# Patient Record
Sex: FEMALE | Race: WHITE | NOT HISPANIC OR LATINO | Employment: OTHER | URBAN - METROPOLITAN AREA
[De-identification: names, ages, dates, MRNs, and addresses within clinical notes are randomized per-mention and may not be internally consistent; named-entity substitution may affect disease eponyms.]

---

## 2022-02-25 ENCOUNTER — HOSPITAL ENCOUNTER (EMERGENCY)
Facility: HOSPITAL | Age: 72
Discharge: HOME | End: 2022-02-26
Attending: EMERGENCY MEDICINE
Payer: MEDICARE
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
## 2022-02-25 DIAGNOSIS — S12.401A CLOSED NONDISPLACED FRACTURE OF FIFTH CERVICAL VERTEBRA, UNSPECIFIED FRACTURE MORPHOLOGY, INITIAL ENCOUNTER: Primary | ICD-10-CM

## 2022-02-25 DIAGNOSIS — M54.2 NECK PAIN: ICD-10-CM

## 2022-02-25 PROCEDURE — 99284 PR EMERGENCY DEPT VISIT,LEVEL IV: ICD-10-PCS | Mod: ,,, | Performed by: EMERGENCY MEDICINE

## 2022-02-25 PROCEDURE — 99284 EMERGENCY DEPT VISIT MOD MDM: CPT | Mod: ,,, | Performed by: EMERGENCY MEDICINE

## 2022-02-25 PROCEDURE — 25000003 PHARM REV CODE 250: Performed by: EMERGENCY MEDICINE

## 2022-02-25 PROCEDURE — 99284 EMERGENCY DEPT VISIT MOD MDM: CPT | Mod: 25

## 2022-02-25 PROCEDURE — 25000003 PHARM REV CODE 250: Performed by: STUDENT IN AN ORGANIZED HEALTH CARE EDUCATION/TRAINING PROGRAM

## 2022-02-25 RX ORDER — OXYCODONE HYDROCHLORIDE 5 MG/1
5 TABLET ORAL
Status: COMPLETED | OUTPATIENT
Start: 2022-02-25 | End: 2022-02-25

## 2022-02-25 RX ADMIN — OXYCODONE 5 MG: 5 TABLET ORAL at 06:02

## 2022-02-25 RX ADMIN — OXYCODONE 5 MG: 5 TABLET ORAL at 11:02

## 2022-02-26 VITALS
HEART RATE: 56 BPM | WEIGHT: 190 LBS | BODY MASS INDEX: 30.53 KG/M2 | HEIGHT: 66 IN | OXYGEN SATURATION: 99 % | DIASTOLIC BLOOD PRESSURE: 68 MMHG | TEMPERATURE: 98 F | SYSTOLIC BLOOD PRESSURE: 133 MMHG | RESPIRATION RATE: 16 BRPM

## 2022-02-26 RX ORDER — OXYCODONE HYDROCHLORIDE 5 MG/1
5 TABLET ORAL EVERY 4 HOURS PRN
Qty: 18 TABLET | Refills: 0 | Status: SHIPPED | OUTPATIENT
Start: 2022-02-26 | End: 2022-03-01

## 2022-02-26 RX ORDER — IBUPROFEN 600 MG/1
600 TABLET ORAL EVERY 6 HOURS PRN
Qty: 20 TABLET | Refills: 0 | Status: SHIPPED | OUTPATIENT
Start: 2022-02-26 | End: 2022-03-03

## 2022-02-26 NOTE — PROGRESS NOTES
"ED Resident HAND-OFF NOTE:  10:15 PM 2/25/2022  Eneida Cadena is a 71 y.o. female who presented to the ED on 2/25/2022 and has been managed by Dr. Mota and Dr. Franks, who reports patient C/O fall. I assumed care of patient from off-going ED physician team at 10:15 PM pending MRI cervical spine.    On my evaluation, Eneida Cadena appears well hemodynamically stable and in NAD. Thus far, Eneida Cdaena has received:  Medications   oxyCODONE immediate release tablet 5 mg (5 mg Oral Given 2/25/22 1854)       On my exam, I appreciate:  BP (!) 164/75   Pulse 68   Temp 98.4 °F (36.9 °C) (Oral)   Resp 18   Ht 5' 6" (1.676 m)   Wt 86.2 kg (190 lb)   SpO2 99%   BMI 30.67 kg/m²     ED Course as of 02/26/22 0332 Fri Feb 25, 2022 2127 CT head, maxillofacial, and cervical spine are remarkable for an acute fracture of the lamina and base of the C5 spinous process without significant displacement.  Otherwise, no acute fractures, intracranial hemorrhage, or large vessel infarct.  I had a phone conversation with neurosurgery who recommends obtaining an MRI.  Patient is aware the plan and in agreement. [BD]   2127 Patient's pain has been well controlled with p.o. oxycodone [BD]   2240 MRI still pending.  Patient care has been handed off to Dr. Yao.  [BD]      ED Course User Index  [BD] Brice Franks MD        Additional ED course:  MRI discussed with neurosurgery.  No unstable ligamentous or bony injury.  Patient discharged with symptomatic management.  She was given instructions to  her images to take to her subsequent appointments    Disposition:  Discharge  I have discussed and counseled Eneida Cadena regarding exam, results, diagnosis, treatment, and plan.  ______________________  Ozzy Yao MD   Emergency Medicine Resident  2/25/2022        "

## 2022-02-26 NOTE — ED PROVIDER NOTES
Encounter Date: 2/25/2022       History     Chief Complaint   Patient presents with    Fall     Pt with a fall last night getting out of bed, hit face on night stand, head jerked back.  Pt + cervical neck tenderness that radiates to both shoulders.  Also with bruising to nose, cheek and forehead, denies LOC.     71-year-old female with PMH of arthritis presents with neck pain.  Neck pain is new since last night, acute onset after mechanical fall, midline posterior, 7/10 intensity, sharp, radiates to the bilateral shoulders, aggravated with movement and walking, no relieving factors, and without associated numbness/tingling of the hands.  Patient states she had her hand resting on a nightstand when she went to stand up and her hand slipped, so she subsequently fell forward, hitting the middle of her face on the side of the nightstand.  Denies LOC and subsequent nausea/vomiting.  She reported a small amount of bleeding from her nose afterwards and bruising to her forehead and nose.  She reports persistent pain throughout the day today.  Denies any notable muscle weakness, numbness, tingling, changes in vision. She is visiting from [facility].  She has taken Aleve without relief.  Review of systems otherwise negative.    The history is provided by the patient.     Review of patient's allergies indicates:  Not on File  No past medical history on file.  No past surgical history on file.  No family history on file.     Review of Systems   Constitutional: Negative for chills and fever.   HENT: Positive for nosebleeds. Negative for congestion and sinus pain.    Eyes: Negative for pain and visual disturbance.   Respiratory: Negative for cough and shortness of breath.    Cardiovascular: Negative for chest pain and leg swelling.   Gastrointestinal: Negative for abdominal pain, constipation, diarrhea, nausea and vomiting.   Endocrine: Negative for polydipsia and polyuria.   Genitourinary: Negative for dysuria and  hematuria.   Musculoskeletal: Positive for arthralgias (Chronic) and neck pain. Negative for back pain.   Skin: Positive for wound. Negative for rash.   Neurological: Negative for dizziness, weakness, light-headedness and headaches.       Physical Exam     Initial Vitals [02/25/22 1832]   BP Pulse Resp Temp SpO2   (!) 164/75 68 16 98.4 °F (36.9 °C) 99 %      MAP       --         Physical Exam    Nursing note and vitals reviewed.  Constitutional: She appears well-developed and well-nourished. She is not diaphoretic. No distress.   Patient placed in C-collar upon her arrival to her bed   HENT:   Head: Normocephalic and atraumatic.   Eyes: Conjunctivae and EOM are normal. Pupils are equal, round, and reactive to light.   Neck: Neck supple.   Midline neck tenderness to palpation.  Patient not ranged   Normal range of motion.  Cardiovascular: Normal rate, regular rhythm, normal heart sounds and intact distal pulses.   No murmur heard.  Pulmonary/Chest: Breath sounds normal. No respiratory distress. She has no wheezes. She has no rhonchi. She has no rales.   Abdominal: Abdomen is soft. She exhibits no distension. There is no abdominal tenderness. There is no rebound and no guarding.   Musculoskeletal:         General: No tenderness or edema.      Cervical back: Normal range of motion and neck supple.     Neurological: She is alert and oriented to person, place, and time. She has normal strength. No sensory deficit. GCS score is 15. GCS eye subscore is 4. GCS verbal subscore is 5. GCS motor subscore is 6.   Skin: Skin is warm and dry. Capillary refill takes less than 2 seconds.         ED Course   Procedures  Labs Reviewed - No data to display       Imaging Results          MRI Cervical Spine Without Contrast (Final result)  Result time 02/26/22 01:50:40    Final result by Hosea Ge MD (02/26/22 01:50:40)                 Impression:      1. Findings suggestive of right C5 lamina and spinous process base fracture,  better depicted on CT.  There is mild interspinous ligament edema suggesting strain, however, the posterior longitudinal ligamentous complex appear grossly intact.  2. Multilevel degenerative spine changes as detailed above.  3. Multilevel grade 1 spondylolisthesis as detailed above.    Electronically signed by resident: Farnaz Archer  Date:    02/26/2022  Time:    00:22    Electronically signed by: Hosea Ge MD  Date:    02/26/2022  Time:    01:50             Narrative:    EXAMINATION:  MRI CERVICAL SPINE WITHOUT CONTRAST    CLINICAL HISTORY:  Spine fracture, cervical, traumatic;Neck trauma, ligament injury suspected (Age >= 16y);.    TECHNIQUE:  Multiplanar, multisequence imaging of the cervical spine without the use of intravenous contrast.    COMPARISON:  None.    FINDINGS:  Alignment: Slight reversal of normal cervical lordosis.  Grade 1 anterolisthesis of C2 on C3, C3 on C4, and C4 on C5.    Vertebrae: Normal vertebral body marrow signal.  Multilevel facet joint and lamina edema, most prominent in right C5 in keeping with known spinous process base and right lamina fracture demonstrated on CT.  There is mild interspinous edema however posterior longitudinal ligamentous complex appears grossly intact.    Discs: Multilevel disc desiccation    Cord: Normal.    Skull base and craniocervical junction: Normal.    Degenerative findings:    C2-C3: Posterior disc osteophyte uncovertebral joint spurring and facet arthropathy contributing moderate neural foraminal narrowing.    C3-C4: Posterior disc osteophyte complex, uncovertebral joint spurring, and facet arthropathy contributing to severe right and moderate left neural foraminal narrowing.  No central canal stenosis.    C4-C5: Posterior disc osteophyte complex, uncovertebral joint spurring, facet arthropathy, contributing to severe bilateral neural foraminal narrowing and mild central canal stenosis    C5-C6: Posterior disc osteophyte complex, uncovertebral  joint spurring, and facet arthropathy contributing severe bilateral neural foraminal narrowing and moderate central canal stenosis.    C6-C7: Posterior disc osteophyte complex, uncovertebral joint spurring, and facet arthropathy contributing to mild right and moderate left neural foraminal narrowing and moderate central canal narrowing.    C7-T1: No significant spinal canal stenosis or neural foraminal narrowing.    Paraspinal muscles & soft tissues: Normal.                               CT Head Without Contrast (Final result)  Result time 02/25/22 20:58:07    Final result by Cedrick Carrero MD (02/25/22 20:58:07)                 Impression:      No acute intracranial process.      Electronically signed by: Cedrick Carrero  Date:    02/25/2022  Time:    20:58             Narrative:    EXAMINATION:  CT HEAD WITHOUT CONTRAST    CLINICAL HISTORY:  Head trauma, minor (Age >= 65y);    TECHNIQUE:  Low dose axial CT images obtained throughout the head without intravenous contrast. Sagittal and coronal reconstructions were performed.    COMPARISON:  None.    FINDINGS:  Intracranial compartment:    Ventricles and sulci are normal in size for age without evidence of hydrocephalus. No extra-axial blood or fluid collections.    Mild involutional changes and chronic microvascular ischemic changes in the periventricular white matter.  No parenchymal mass, hemorrhage, edema or major vascular distribution infarct.    Skull/extracranial contents (limited evaluation): No fracture. Mastoid air cells and paranasal sinuses are essentially clear.    Postop changes of the facial bones.                                CT Maxillofacial Without Contrast (Final result)  Result time 02/25/22 21:07:18    Final result by Cedrick Carrero MD (02/25/22 21:07:18)                 Impression:      1. No acute facial fractures.  2. Small acute fracture of the lamina and base of the spinous process of C5 posteriorly on the right.  No significant  displacement.  Follow-up recommended.  3. Chronic and postoperative changes.  4.  This report was flagged in Epic as abnormal.      Electronically signed by: Cedrick Lawrence  Date:    02/25/2022  Time:    21:07             Narrative:    EXAMINATION:  CT MAXILLOFACIAL WITHOUT CONTRAST    CLINICAL HISTORY:  Facial trauma, blunt;    TECHNIQUE:  Low dose axial images, sagittal and coronal reformations were obtained through the face.  Contrast was not administered.    COMPARISON:  None    FINDINGS:  No acute facial fractures are detected.    Nasal bones are intact.  Orbits are intact.  Zygomatic arches are intact.  The mandible is intact.    Postoperative changes of the facial bones with surgical fixation hardware noted anteriorly.    No significant paranasal sinus disease.    Chronic changes of the temporomandibular joints bilaterally.    Slight reversal of normal cervical lordosis with grade 1 spondylolisthesis of C2 on C3, C3 on C4 and C4 on C5 with associated degenerative changes.    There is a small acute fracture of the lamina and base of the spinous process of C5 on the right.  Follow-up recommended.  No significant displacement.                                CT Cervical Spine Without Contrast (Final result)  Result time 02/25/22 21:51:30    Final result by Hosea Ge MD (02/25/22 21:51:30)                 Impression:      1. Acute fracture of the right lamina and base of the spinous process of C5 with minimal posteroinferior displacement of the fracture fragment. No additional acute fracture.  2. Advanced multilevel degenerative spine changes, as detailed above, most prominent at C4-C5 and C5-C6 with severe bilateral neural foraminal narrowing and mild-to-moderate central canal stenosis.  3. Multilevel spondylolisthesis, as above.  This report was flagged in Epic as abnormal.    The critical information above was relayed directly by Hosea Ge MD by [facility] secure chat to Anamika Mota on 2/25/2022  at 21:50.    Electronically signed by resident: Farnaz Archer  Date:    02/25/2022  Time:    21:06    Electronically signed by: Hosea Ge MD  Date:    02/25/2022  Time:    21:51             Narrative:    EXAMINATION:  CT CERVICAL SPINE WITHOUT CONTRAST    CLINICAL HISTORY:  Neck trauma (Age >= 65y);    TECHNIQUE:  Low dose axial images, sagittal and coronal reformations were performed though the cervical spine.  Contrast was not administered.    COMPARISON:  CT maxillofacial 02/25/2022    FINDINGS:  Slight reversal of normal cervical lordosis.  Grade 1 anterolisthesis of C2 on C3, C3 on C4, and C4 on C5. The vertebral body heights appear well-maintained.  Acute fracture of the right lamina and base of the spinous process of C5 with minimal posteroinferior displacement of the fracture fragment.  No additional acute fracture.  No bone destruction.  Multilevel intervertebral disc narrowing most prominent at C4-C5, C5-C6, and T1-T2.    Focal degenerative changes are described below.    C2 -- C3: Posterior disc osteophyte complex, uncovertebral joint spurring and bilateral facet arthropathy contributing to moderate right neural foraminal narrowing.  No central canal stenosis.    C3 -- C4: Posterior disc osteophyte complex, uncovertebral joint spurring, and facet arthropathy contributing to severe right and moderate left neural foraminal narrowing, and minimal central canal stenosis    C4 -- C5: Posterior disc osteophyte complex, uncovertebral joint spurring, and facet arthropathy contributing to severe bilateral neural foraminal narrowing and mild central canal stenosis    C5 -- C6: Posterior disc osteophyte complex, uncovertebral joint spurring, and facet arthropathy contributing to severe bilateral neural foraminal narrowing and moderate central canal stenosis    C6 -- C7: Posterior disc osteophyte complex, uncovertebral joint spurring, and facet arthropathy contributing to moderate bilateral neural foraminal  narrowing and moderate central canal stenosis    C7 -- T1: No central canal stenosis or neural foraminal narrowing.    The spinal canal otherwise appears unremarkable.  No intradural abnormalities are identified.    Evaluation of the surrounding soft tissues is unremarkable .                                 Medications   oxyCODONE immediate release tablet 5 mg (5 mg Oral Given 2/25/22 1794)   oxyCODONE immediate release tablet 5 mg (5 mg Oral Given 2/25/22 7997)     Medical Decision Making:   Initial Assessment:   71-year-old female with PMH of arthritis presents 1 day status post mechanical fall with head trauma and no LOC, nausea, or vomiting, persistent posterior midline neck pain, hemodynamically stable, physical exam shows midline TTP without neurological deficit. Will obtain CTs and treat pain with oxycodone PO.   Differential Diagnosis:   Cervical fracture, cervical dislocation, spinal injury, intracranial hemorrhage/hematoma, nasal fracture  Clinical Tests:   Radiological Study: Ordered and Reviewed  ED Management:  See ED course            Attending Attestation:   Physician Attestation Statement for Resident:  As the supervising MD   Physician Attestation Statement: I have personally seen and examined this patient.   I agree with the above history. -:   As the supervising MD I agree with the above PE.    As the supervising MD I agree with the above treatment, course, plan, and disposition.   -: MRI without posterior longitudinal ligament involvement.  No neuro deficits.  Plan discharge home with outpatient follow-up as needed.  Prescription given for oxycodone.  Safe use of opioids discussed at bedside.  I have reviewed and agree with the residents interpretation of the following: CT scans.  I have reviewed the following: old records at this facility.                ED Course as of 02/25/22 2240 Fri Feb 25, 2022 2127 CT head, maxillofacial, and cervical spine are remarkable for an acute fracture of the  "lamina and base of the C5 spinous process without significant displacement.  Otherwise, no acute fractures, intracranial hemorrhage, or large vessel infarct.  I had a phone conversation with neurosurgery who recommends obtaining an MRI.  They state that if the MRI shows no ligamentous injury, the patient may be discharged home without a C-collar.  Patient is aware the plan and in agreement. [BD]   2127 Patient's pain has been well controlled with p.o. oxycodone [BD]   2240 MRI still pending.  Patient care has been handed off to Dr. Yao.  [BD]      ED Course User Index  [BD] Brice Franks MD             Clinical Impression:   Final diagnoses:  [W19.XXXA] Fall, initial encounter  [M54.2] Neck pain  [S12.401A] Closed nondisplaced fracture of fifth cervical vertebra, unspecified fracture morphology, initial encounter (Primary)          ED Disposition Condition    Discharge Stable        ED Prescriptions     Medication Sig Dispense Start Date End Date Auth. Provider    oxyCODONE (ROXICODONE) 5 MG immediate release tablet Take 1 tablet (5 mg total) by mouth every 4 (four) hours as needed for Pain. 18 tablet 2/26/2022 3/1/2022 Anamika Mota MD    ibuprofen (ADVIL,MOTRIN) 600 MG tablet Take 1 tablet (600 mg total) by mouth every 6 (six) hours as needed for Pain. 20 tablet 2/26/2022 3/3/2022 Anamika Mota MD        Follow-up Information     Follow up With Specialties Details Why Contact Info    Your primary care doctor  Schedule an appointment as soon as possible for a visit       [phone] and ask for the "film library"  Schedule an appointment as soon as possible for a visit              Brice Franks MD  Resident  02/25/22 2491       Anamika Mota MD  02/26/22 7626    "

## 2022-02-26 NOTE — ED NOTES
Bed: [facility] 08  Expected date:   Expected time:   Means of arrival:   Comments:  Charge Using

## 2022-02-26 NOTE — DISCHARGE INSTRUCTIONS
Diagnosis: C5 lamina and spinous process base fracture    Tests today showed:   Labs Reviewed - No data to display  MRI Cervical Spine Without Contrast   Final Result      1. Findings suggestive of right C5 lamina and spinous process base fracture, better depicted on CT.  There is mild interspinous ligament edema suggesting strain, however, the posterior longitudinal ligamentous complex appear grossly intact.   2. Multilevel degenerative spine changes as detailed above.   3. Multilevel grade 1 spondylolisthesis as detailed above.      Electronically signed by resident: Farnaz Archer   Date:    02/26/2022   Time:    00:22      Electronically signed by: Hosea Ge MD   Date:    02/26/2022   Time:    01:50      CT Head Without Contrast   Final Result      No acute intracranial process.         Electronically signed by: Cedrick Lawrence   Date:    02/25/2022   Time:    20:58      CT Maxillofacial Without Contrast   Final Result   Abnormal      1. No acute facial fractures.   2. Small acute fracture of the lamina and base of the spinous process of C5 posteriorly on the right.  No significant displacement.  Follow-up recommended.   3. Chronic and postoperative changes.   4.  This report was flagged in Epic as abnormal.         Electronically signed by: Cedrick Lawrence   Date:    02/25/2022   Time:    21:07      CT Cervical Spine Without Contrast   Final Result   Abnormal      1. Acute fracture of the right lamina and base of the spinous process of C5 with minimal posteroinferior displacement of the fracture fragment. No additional acute fracture.   2. Advanced multilevel degenerative spine changes, as detailed above, most prominent at C4-C5 and C5-C6 with severe bilateral neural foraminal narrowing and mild-to-moderate central canal stenosis.   3. Multilevel spondylolisthesis, as above.   This report was flagged in Epic as abnormal.      The critical information above was relayed directly by Hosea Ge MD by  epic secure chat to Anamika JALEN Svetlana on 2/25/2022 at 21:50.      Electronically signed by resident: Farnaz Archer   Date:    02/25/2022   Time:    21:06      Electronically signed by: Hosea Ge MD   Date:    02/25/2022   Time:    21:51          Treatments you had today:   Medications   oxyCODONE immediate release tablet 5 mg (5 mg Oral Given 2/25/22 6734)   oxyCODONE immediate release tablet 5 mg (5 mg Oral Given 2/25/22 8867)       Follow-Up Plan:  - Follow-up with primary care doctor within 3 - 5 days  - Additional testing and/or evaluation as directed by your primary doctor    Return to the Emergency Department for symptoms including but not limited to: worsening symptoms, shortness of breath or chest pain, vomiting with inability to hold down fluids, fevers greater than 100.4°F, passing out/fainting/unconsciousness, or other concerning symptoms.

## 2022-02-26 NOTE — ED NOTES
Patient identifiers verified and correct for Eneida Easton   C/C: Patient presents to the ED for evaluation after falling at home, hitting her nose and forehead, complains of neck pain, denies LOC   APPEARANCE: awake and alert in NAD.  SKIN: warm, dry and intact. No breakdown or bruising.  MUSCULOSKELETAL: Patient moving all extremities spontaneously, no obvious swelling or deformities noted. Ambulates independently.  RESPIRATORY: Denies shortness of breath.Respirations unlabored.   CARDIAC: Denies CP, 2+ distal pulses; no peripheral edema  ABDOMEN: S/ND/NT, Denies nausea  : voids spontaneously, denies difficulty  Neurologic: AAO x 4; follows commands equal strength in all extremities; denies numbness/tingling. Denies dizziness